# Patient Record
Sex: MALE | Race: WHITE
[De-identification: names, ages, dates, MRNs, and addresses within clinical notes are randomized per-mention and may not be internally consistent; named-entity substitution may affect disease eponyms.]

---

## 2017-04-19 ENCOUNTER — HOSPITAL ENCOUNTER (EMERGENCY)
Dept: HOSPITAL 74 - JER | Age: 27
Discharge: HOME | End: 2017-04-19
Payer: COMMERCIAL

## 2017-04-19 VITALS — TEMPERATURE: 98.5 F | DIASTOLIC BLOOD PRESSURE: 75 MMHG | HEART RATE: 99 BPM | SYSTOLIC BLOOD PRESSURE: 116 MMHG

## 2017-04-19 VITALS — BODY MASS INDEX: 21.8 KG/M2
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
## 2017-04-19 DIAGNOSIS — S00.11XA: ICD-10-CM

## 2017-04-19 DIAGNOSIS — F07.81: Primary | ICD-10-CM
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
## 2017-04-19 DIAGNOSIS — S00.12XA: ICD-10-CM
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
## 2017-12-03 ENCOUNTER — HOSPITAL ENCOUNTER (EMERGENCY)
Dept: HOSPITAL 74 - JER | Age: 27
LOS: 1 days | Discharge: HOME | End: 2017-12-04
Payer: COMMERCIAL

## 2017-12-03 VITALS — BODY MASS INDEX: 21.2 KG/M2

## 2017-12-03 VITALS — TEMPERATURE: 98.7 F

## 2017-12-03 DIAGNOSIS — G44.209: Primary | ICD-10-CM

## 2017-12-03 NOTE — PDOC
History of Present Illness





- General


Chief Complaint: Headache


Stated Complaint: MIGRAINE


Time Seen by Provider: 17 22:07


History Source: Patient


Exam Limitations: No Limitations





- History of Present Illness


Initial Comments: 





17 22:48


Patient is a 27-year-old male with no past medical history complaining off 

headache 5 days. States gradual onset of progressively worsening now 5/10, 

throbbing to the right parietal area and worse with laying down. States he 

googled his symptoms and thinks he has a migraine. No history of migraine. 

Denies nausea, vomiting. States he has mild dizziness, photophobia and 

phonophobia. He has been taking Tylenol and Motrin intermittently yesterday but 

[facility] migraine medicines and has been taking it for 2 days with no relief 

of symptoms. Denies any URI symptoms, nasal congestion in the past few days. 

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  No FamHx aneurysm








PMD: Dr. Anne


PMHX:  as read 


PSOCHx:  (+) cig - 1 PPD, (-) etoh, (-) durg


ALL:  NKDA








GENERAL/CONSTITUTIONAL: [No fever or chills. No weakness. No weight change.]


HEAD, EYES, EARS, NOSE AND THROAT: [No change in vision. No ear pain or 

discharge. No sore throat.]


CARDIOVASCULAR: [No chest pain or shortness of breath.]


RESPIRATORY: [No cough, wheezing, or hemoptysis.]


GASTROINTESTINAL: [No nausea, vomiting, diarrhea or constipation. No rectal 

bleeding.]


GENITOURINARY: [No dysuria, frequency, or change in urination.]


MUSCULOSKELETAL: [No joint or muscle swelling or pain. No neck or back pain.]


SKIN AND BREASTS: [No rash or easy bruising.]


NEUROLOGIC: (+) headache, (-) vertigo, loss of consciousness, or loss of 

sensation.]


PSYCHIATRIC: [No depression or anxiety.]


ENDOCRINE: [No increased thirst. No abnormal weight change.]


HEMATOLOGIC/LYMPHATIC: [No anemia, easy bleeding, or history of blood clots.]


ALLERGIC/IMMUNOLOGIC: [No hives or skin allergy. No latex allergy.]





GENERAL: [The patient is awake, alert, and fully oriented, in no acute distress.

]


HEAD: [Normal with no signs of trauma.]


EYES: [Pupils equal, round and reactive to light, extraocular movements intact, 

sclera anicteric, conjunctiva clear.]


ENT: [Ears normal, nares patent, oropharynx clear without exudates.  Moist 

mucous membranes.]


NECK: [Normal range of motion, supple without lymphadenopathy, JVD, or masses.]


LUNGS: [Breath sounds equal, clear to auscultation bilaterally.  No wheezes, 

and no crackles.]


HEART: [Regular rate and rhythm, normal S1 and S2 without murmur, rub.]


ABDOMEN: [Soft, nontender, normoactive bowel sounds.  No guarding, no rebound.  

No masses.]


EXTREMITIES: [Normal range of motion, no edema.  No clubbing or cyanosis. No 

cords, erythema, or tenderness.]


NEUROLOGICAL: [Cranial nerves II through XII grossly intact.  Normal speech, 

normal gait, cerebellar function intact, 5/5 strength equal bilaterally


PSYCH: [Normal mood, normal affect.]


SKIN: [Warm, Dry, normal turgor, no rashes or lesions noted.]


17 00:14








Past History





- Past Medical History


Allergies/Adverse Reactions: 


 Allergies











Allergy/AdvReac Type Severity Reaction Status Date / Time


 


No Known Allergies Allergy   Verified 17 21:31











Home Medications: 


Ambulatory Orders





NK [No Known Home Medication]  17 








COPD: No





- Immunization History


Immunization Up to Date: Yes





- Suicide/Smoking/Psychosocial Hx


Smoking History: Current every day smoker


Have you smoked in the past 12 months: Yes


Number of Cigarettes Smoked Daily: 10


Information on smoking cessation initiated: No


'Breaking Loose' booklet given: 17


Hx Alcohol Use: No


Drug/Substance Use Hx: No


Substance Use Type: None





*Physical Exam





- Vital Signs


 Last Vital Signs











Temp Pulse Resp BP Pulse Ox


 


 98.7 F   78   16   139/90   99 


 


 17 21:33  17 21:33  17 21:33  17 21:33  17 21:33














Medical Decision Making





- Medical Decision Making





17 00:15


rosalba is a 27-year-old male with no past medical history complaining off 

headache 5 days. States gradual onset of progressively worsening now 5/10, 

throbbing to the right parietal area and worse with laying down.  Reports a lot 

of tension and stress.  Suspect tension headache.


will give Reglan and toradol, IVF.














17 00:16


Patient states was feeling better and then he got up and his pain worsened.  

will at this point CT head.





17 01:00


atient Full Name: CRISTIANO HESS


Patient MRN: [id number]


Accession No: [id number]


Patient : 1990


Reason for Exam: HEADACHES





Referring Physician:





Patient Name: DESHAWN QUINONEZ





THIS IS A PRELIMINARY REPORT FROM IMAGING ON CALL





DATE OF SERVICE: 2017 00:33:49





IMAGES: 166





EXAM: CT HEAD





HISTORY: Headaches





COMPARISON: None.





FINDINGS:


Normal brain. No acute intracranial abnormality. No hemorrhage. No visible 

infarct or mass. Osseous structures are intact.











THIS DOCUMENT HAS BEEN ELECTRONICALLY SIGNED





Trey Hill MD





2017 00:51 EST





M.D. Please call [facility] Call [phone] ([phone]) with questions.














INTERPRETING RADIOLOGIST:


Trey Hill MD


Electronically Signed: Dec 4th, 2017 12:53AM ES








I discussed the physical exam findings, ancillary test results and final 

diagnoses with the patient. I answered all of the patient's questions. The 

patient was satisfied with the care received and felt comfortable with the 

discharge plan and treatment plan.  The Patient agrees to follow up with the 

primary care physician within 24-72 hours.





*DC/Admit/Observation/Transfer


Diagnosis at time of Disposition: 


Headache, tension-type


Qualifiers:


 Headache chronicity pattern: unspecified pattern Intractability: not 

intractable Qualified Code(s): G44.209 - Tension-type headache, unspecified, 

not intractable








- Discharge Dispostion


Disposition: HOME


Condition at time of disposition: Stable





- Referrals


Referrals: 


Trey Jackson MD [Staff Physician] - 





- Patient Instructions


Printed Discharge Instructions:  DI for Headache


Additional Instructions: 


Your Discharge Instructions:


You must call primary care physician within 24 hours to arrange follow-up.  

Return to the Emergency Department with any new, persistent or worsening 

symptoms, for fever, chills, SOB, dizziness or any other concerning changes 

that may occur. He must follow-up with neurology, call for an appointment 1-2 

days








- Post Discharge Activity

## 2017-12-04 VITALS — HEART RATE: 83 BPM | SYSTOLIC BLOOD PRESSURE: 151 MMHG | DIASTOLIC BLOOD PRESSURE: 82 MMHG

## 2018-05-18 ENCOUNTER — HOSPITAL ENCOUNTER (EMERGENCY)
Dept: HOSPITAL 74 - JER | Age: 28
Discharge: HOME | End: 2018-05-18
Payer: COMMERCIAL

## 2018-05-18 VITALS — BODY MASS INDEX: 22.4 KG/M2

## 2018-05-18 VITALS — HEART RATE: 80 BPM | DIASTOLIC BLOOD PRESSURE: 80 MMHG | SYSTOLIC BLOOD PRESSURE: 136 MMHG | TEMPERATURE: 98.3 F

## 2018-05-18 DIAGNOSIS — L23.7: Primary | ICD-10-CM

## 2018-05-18 NOTE — PDOC
History of Present Illness





- General


Chief Complaint: Rash


Stated Complaint: POISON IVY


Time Seen by Provider: 05/18/18 18:05


History Source: Patient





- History of Present Illness


Timing/Duration: reports: other


Location: reports: extremities, torso





Past History





- Past Medical History


Allergies/Adverse Reactions: 


 Allergies











Allergy/AdvReac Type Severity Reaction Status Date / Time


 


No Known Allergies Allergy   Verified 05/18/18 18:08











Home Medications: 


Ambulatory Orders





Clobetasol Prop 0.05% Top Cr [Temovate (Nf)] 30 gm NR BID #1 tube 05/18/18 








COPD: No





- Immunization History


Immunization Up to Date: Yes





- Suicide/Smoking/Psychosocial Hx


Smoking History: Current every day smoker


Have you smoked in the past 12 months: Yes


Number of Cigarettes Smoked Daily: 10


Information on smoking cessation initiated: Yes


'Breaking Loose' booklet given: 04/19/17


Hx Alcohol Use: No


Drug/Substance Use Hx: No


Substance Use Type: None





**Review of Systems





- Review of Systems


Constitutional: No: Chills, Fever


Integumentary: Yes: Pruritus, Rash





*Physical Exam





- Vital Signs


 Last Vital Signs











Temp Pulse Resp BP Pulse Ox


 


 98.3 F   80   16   136/80   98 


 


 05/18/18 18:05  05/18/18 18:05  05/18/18 18:05  05/18/18 18:05  05/18/18 18:05














- Physical Exam


General Appearance: Yes: Appropriately Dressed.  No: Apparent Distress


HEENT: positive: Normal Voice


Neck: positive: Supple


Respiratory/Chest: negative: Respiratory Distress


Integumentary: positive: Dry, Warm


Neurologic: positive: Fully Oriented, Alert, Normal Mood/Affect (multiple areas 

of streak-like/linear areas of edema and erythema to trunl and upper/lower exts

, no blisters)





Medical Decision Making





- Medical Decision Making





05/18/18 18:38





28-year-old male denies any past medical history, presents with pruritic rash 

to chest, upper and lower extremity that has been present 2 weeks, but getting 

better.  Patient states he was exposed to poison ivy 2 weeks ago while working 

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  States he did not seek medical 

evaluation and instead use an over-the-counter cream that he states is made 

specifically for poison ivy.  States rash has significantly improved, but 

continues to have some itching.  Patient well-appearing and stable areas of 

streak-like edema and erythema to trunk and upper and lower extremities 

consistent with poison ivy dermatitis.  DC with steroid cream, though unclear 

benefit given duration of rash.  Reasons to follow-up with dermatology 

discussed with patient.  Patient to wash clothing worn since rash.














*DC/Admit/Observation/Transfer


Diagnosis at time of Disposition: 


 Poison ivy








- Discharge Dispostion


Disposition: HOME


Condition at time of disposition: Good





- Prescriptions


Prescriptions: 


Clobetasol Prop 0.05% Top Cr [Temovate (Nf)] 30 gm NR BID #1 tube





- Referrals


Referrals: 


ON STAFF,NOT [Primary Care Provider] - 


Jill Zamora MD [Staff Physician] - 





- Patient Instructions


Printed Discharge Instructions:  Poison Ivy, Poison Oak, Poison [place]ac


Additional Instructions: 


Used steroids as directed.  Please wash clothing worn since contact with poison 

ivy.


If symptoms persist, please follow-up with Dr. Zamora of dermatology





- Post Discharge Activity

## 2018-05-18 NOTE — PDOC
Rapid Medical Evaluation


Time Seen by Provider: 05/18/18 18:05


Medical Evaluation: 


 Allergies











Allergy/AdvReac Type Severity Reaction Status Date / Time


 


No Known Allergies Allergy   Verified 12/03/17 21:31








I have performed a brief in-person evaluation of this patient. 


The patient presents with a chief complaint of:  poison ivy x 2 weeks.  hasn't 

gone away


Pertinent physical exam findings:  scabs on left arm


I have ordered the following:  nothing


The patient will proceed to the ED for further evaluation. 











**Discharge Disposition





- Diagnosis


 Poison ivy








- Referrals





- Patient Instructions





- Post Discharge Activity